# Patient Record
Sex: MALE | Race: WHITE | NOT HISPANIC OR LATINO | Employment: FULL TIME | URBAN - METROPOLITAN AREA
[De-identification: names, ages, dates, MRNs, and addresses within clinical notes are randomized per-mention and may not be internally consistent; named-entity substitution may affect disease eponyms.]

---

## 2020-07-02 ENCOUNTER — RECORDS - HEALTHEAST (OUTPATIENT)
Dept: LAB | Facility: CLINIC | Age: 30
End: 2020-07-02

## 2020-07-02 LAB
ALBUMIN SERPL-MCNC: 3.8 G/DL (ref 3.5–5)
ALP SERPL-CCNC: 82 U/L (ref 45–120)
ALT SERPL W P-5'-P-CCNC: 79 U/L (ref 0–45)
ANION GAP SERPL CALCULATED.3IONS-SCNC: 15 MMOL/L (ref 5–18)
AST SERPL W P-5'-P-CCNC: 79 U/L (ref 0–40)
BILIRUB SERPL-MCNC: 1 MG/DL (ref 0–1)
BUN SERPL-MCNC: 6 MG/DL (ref 8–22)
CALCIUM SERPL-MCNC: 9.9 MG/DL (ref 8.5–10.5)
CHLORIDE BLD-SCNC: 107 MMOL/L (ref 98–107)
CO2 SERPL-SCNC: 20 MMOL/L (ref 22–31)
CREAT SERPL-MCNC: 0.94 MG/DL (ref 0.7–1.3)
GFR SERPL CREATININE-BSD FRML MDRD: >60 ML/MIN/1.73M2
GLUCOSE BLD-MCNC: 91 MG/DL (ref 70–125)
POTASSIUM BLD-SCNC: 3.7 MMOL/L (ref 3.5–5)
PROT SERPL-MCNC: 7.2 G/DL (ref 6–8)
SODIUM SERPL-SCNC: 142 MMOL/L (ref 136–145)

## 2020-07-17 ENCOUNTER — RECORDS - HEALTHEAST (OUTPATIENT)
Dept: LAB | Facility: CLINIC | Age: 30
End: 2020-07-17

## 2020-07-17 LAB
ANION GAP SERPL CALCULATED.3IONS-SCNC: 17 MMOL/L (ref 5–18)
BUN SERPL-MCNC: 6 MG/DL (ref 8–22)
CALCIUM SERPL-MCNC: 11.7 MG/DL (ref 8.5–10.5)
CHLORIDE BLD-SCNC: 94 MMOL/L (ref 98–107)
CO2 SERPL-SCNC: 26 MMOL/L (ref 22–31)
CREAT SERPL-MCNC: 1.12 MG/DL (ref 0.7–1.3)
GFR SERPL CREATININE-BSD FRML MDRD: >60 ML/MIN/1.73M2
GLUCOSE BLD-MCNC: 104 MG/DL (ref 70–125)
POTASSIUM BLD-SCNC: 3.5 MMOL/L (ref 3.5–5)
SODIUM SERPL-SCNC: 137 MMOL/L (ref 136–145)

## 2020-08-05 ENCOUNTER — RECORDS - HEALTHEAST (OUTPATIENT)
Dept: LAB | Facility: CLINIC | Age: 30
End: 2020-08-05

## 2020-08-05 LAB
ANION GAP SERPL CALCULATED.3IONS-SCNC: 21 MMOL/L (ref 5–18)
BUN SERPL-MCNC: 5 MG/DL (ref 8–22)
CALCIUM SERPL-MCNC: 9.8 MG/DL (ref 8.5–10.5)
CALCIUM, IONIZED MEASURED: 1.06 MMOL/L (ref 1.11–1.3)
CHLORIDE BLD-SCNC: 103 MMOL/L (ref 98–107)
CO2 SERPL-SCNC: 19 MMOL/L (ref 22–31)
CREAT SERPL-MCNC: 0.97 MG/DL (ref 0.7–1.3)
GFR SERPL CREATININE-BSD FRML MDRD: >60 ML/MIN/1.73M2
GLUCOSE BLD-MCNC: 132 MG/DL (ref 70–125)
ION CA PH 7.4: 1.07 MMOL/L (ref 1.11–1.3)
PH: 7.42 (ref 7.35–7.45)
POTASSIUM BLD-SCNC: 3.7 MMOL/L (ref 3.5–5)
PTH-INTACT SERPL-MCNC: 71 PG/ML (ref 10–86)
SODIUM SERPL-SCNC: 143 MMOL/L (ref 136–145)

## 2020-08-07 LAB — ALDOST SERPL-MCNC: 32.1 NG/DL

## 2020-08-08 LAB
ANNOTATION COMMENT IMP: ABNORMAL
METANEPHS SERPL-SCNC: 0.26 NMOL/L (ref 0–0.49)
NORMETANEPHRINE SERPL-SCNC: 1.12 NMOL/L (ref 0–0.89)
RENIN PLAS-CCNC: 22.6 NG/ML/HR

## 2020-09-29 ENCOUNTER — AMBULATORY - HEALTHEAST (OUTPATIENT)
Dept: OTHER | Facility: CLINIC | Age: 30
End: 2020-09-29

## 2020-09-29 ENCOUNTER — DOCUMENTATION ONLY (OUTPATIENT)
Dept: OTHER | Facility: CLINIC | Age: 30
End: 2020-09-29

## 2021-02-19 ENCOUNTER — COMMUNICATION - HEALTHEAST (OUTPATIENT)
Dept: SCHEDULING | Facility: CLINIC | Age: 31
End: 2021-02-19

## 2021-06-16 PROBLEM — K70.10 ALCOHOLIC HEPATITIS WITHOUT ASCITES (H): Status: ACTIVE | Noted: 2021-02-19

## 2021-06-16 PROBLEM — F10.939 ALCOHOL WITHDRAWAL SYNDROME WITH COMPLICATION (H): Status: ACTIVE | Noted: 2018-12-04

## 2021-06-16 PROBLEM — E87.8 ELECTROLYTE DISTURBANCE: Status: ACTIVE | Noted: 2021-02-19

## 2021-06-16 PROBLEM — K85.90 PANCREATITIS, UNSPECIFIED PANCREATITIS TYPE: Status: ACTIVE | Noted: 2021-02-18

## 2021-06-16 PROBLEM — D69.6 THROMBOCYTOPENIA (H): Status: ACTIVE | Noted: 2021-02-19

## 2022-03-03 ENCOUNTER — HOSPITAL ENCOUNTER (EMERGENCY)
Facility: HOSPITAL | Age: 32
Discharge: OTHER INSTITUTION | End: 2022-03-03
Attending: EMERGENCY MEDICINE | Admitting: EMERGENCY MEDICINE
Payer: COMMERCIAL

## 2022-03-03 VITALS
WEIGHT: 250 LBS | OXYGEN SATURATION: 90 % | RESPIRATION RATE: 16 BRPM | DIASTOLIC BLOOD PRESSURE: 66 MMHG | TEMPERATURE: 98.7 F | HEIGHT: 71 IN | SYSTOLIC BLOOD PRESSURE: 114 MMHG | BODY MASS INDEX: 35 KG/M2 | HEART RATE: 95 BPM

## 2022-03-03 DIAGNOSIS — F10.220 ALCOHOL DEPENDENCE WITH UNCOMPLICATED INTOXICATION (H): ICD-10-CM

## 2022-03-03 LAB
ALBUMIN SERPL-MCNC: 4.3 G/DL (ref 3.5–5)
ALP SERPL-CCNC: 100 U/L (ref 45–120)
ALT SERPL W P-5'-P-CCNC: 149 U/L (ref 0–45)
ANION GAP SERPL CALCULATED.3IONS-SCNC: 17 MMOL/L (ref 5–18)
AST SERPL W P-5'-P-CCNC: 158 U/L (ref 0–40)
BASOPHILS # BLD AUTO: 0.1 10E3/UL (ref 0–0.2)
BASOPHILS NFR BLD AUTO: 1 %
BILIRUB DIRECT SERPL-MCNC: 0.5 MG/DL
BILIRUB SERPL-MCNC: 1.3 MG/DL (ref 0–1)
BUN SERPL-MCNC: 5 MG/DL (ref 8–22)
CALCIUM SERPL-MCNC: 8.8 MG/DL (ref 8.5–10.5)
CHLORIDE BLD-SCNC: 101 MMOL/L (ref 98–107)
CO2 SERPL-SCNC: 22 MMOL/L (ref 22–31)
CREAT SERPL-MCNC: 0.8 MG/DL (ref 0.7–1.3)
EOSINOPHIL # BLD AUTO: 0.1 10E3/UL (ref 0–0.7)
EOSINOPHIL NFR BLD AUTO: 1 %
ERYTHROCYTE [DISTWIDTH] IN BLOOD BY AUTOMATED COUNT: 12.6 % (ref 10–15)
ETHANOL SERPL-MCNC: 447 MG/DL
GFR SERPL CREATININE-BSD FRML MDRD: >90 ML/MIN/1.73M2
GLUCOSE BLD-MCNC: 121 MG/DL (ref 70–125)
HCT VFR BLD AUTO: 49.6 % (ref 40–53)
HGB BLD-MCNC: 18 G/DL (ref 13.3–17.7)
IMM GRANULOCYTES # BLD: 0 10E3/UL
IMM GRANULOCYTES NFR BLD: 0 %
LIPASE SERPL-CCNC: 50 U/L (ref 0–52)
LYMPHOCYTES # BLD AUTO: 1.5 10E3/UL (ref 0.8–5.3)
LYMPHOCYTES NFR BLD AUTO: 31 %
MAGNESIUM SERPL-MCNC: 1.9 MG/DL (ref 1.8–2.6)
MCH RBC QN AUTO: 34.5 PG (ref 26.5–33)
MCHC RBC AUTO-ENTMCNC: 36.3 G/DL (ref 31.5–36.5)
MCV RBC AUTO: 95 FL (ref 78–100)
MONOCYTES # BLD AUTO: 0.5 10E3/UL (ref 0–1.3)
MONOCYTES NFR BLD AUTO: 11 %
NEUTROPHILS # BLD AUTO: 2.6 10E3/UL (ref 1.6–8.3)
NEUTROPHILS NFR BLD AUTO: 56 %
NRBC # BLD AUTO: 0 10E3/UL
NRBC BLD AUTO-RTO: 0 /100
PLATELET # BLD AUTO: 287 10E3/UL (ref 150–450)
POTASSIUM BLD-SCNC: 3.7 MMOL/L (ref 3.5–5)
PROT SERPL-MCNC: 7.1 G/DL (ref 6–8)
RBC # BLD AUTO: 5.21 10E6/UL (ref 4.4–5.9)
SARS-COV-2 RNA RESP QL NAA+PROBE: NEGATIVE
SODIUM SERPL-SCNC: 140 MMOL/L (ref 136–145)
WBC # BLD AUTO: 4.7 10E3/UL (ref 4–11)

## 2022-03-03 PROCEDURE — 83690 ASSAY OF LIPASE: CPT | Performed by: EMERGENCY MEDICINE

## 2022-03-03 PROCEDURE — 258N000003 HC RX IP 258 OP 636: Performed by: EMERGENCY MEDICINE

## 2022-03-03 PROCEDURE — 87635 SARS-COV-2 COVID-19 AMP PRB: CPT | Performed by: EMERGENCY MEDICINE

## 2022-03-03 PROCEDURE — 36415 COLL VENOUS BLD VENIPUNCTURE: CPT | Performed by: EMERGENCY MEDICINE

## 2022-03-03 PROCEDURE — A0110 NONEMERGENCY TRANSPORT BUS: HCPCS

## 2022-03-03 PROCEDURE — 96374 THER/PROPH/DIAG INJ IV PUSH: CPT

## 2022-03-03 PROCEDURE — 82435 ASSAY OF BLOOD CHLORIDE: CPT | Performed by: EMERGENCY MEDICINE

## 2022-03-03 PROCEDURE — 85025 COMPLETE CBC W/AUTO DIFF WBC: CPT | Performed by: EMERGENCY MEDICINE

## 2022-03-03 PROCEDURE — 250N000013 HC RX MED GY IP 250 OP 250 PS 637: Performed by: EMERGENCY MEDICINE

## 2022-03-03 PROCEDURE — 250N000011 HC RX IP 250 OP 636: Performed by: EMERGENCY MEDICINE

## 2022-03-03 PROCEDURE — 96375 TX/PRO/DX INJ NEW DRUG ADDON: CPT

## 2022-03-03 PROCEDURE — 250N000009 HC RX 250: Performed by: EMERGENCY MEDICINE

## 2022-03-03 PROCEDURE — 82077 ASSAY SPEC XCP UR&BREATH IA: CPT | Performed by: EMERGENCY MEDICINE

## 2022-03-03 PROCEDURE — 82248 BILIRUBIN DIRECT: CPT | Performed by: EMERGENCY MEDICINE

## 2022-03-03 PROCEDURE — C9803 HOPD COVID-19 SPEC COLLECT: HCPCS

## 2022-03-03 PROCEDURE — 99285 EMERGENCY DEPT VISIT HI MDM: CPT | Mod: 25

## 2022-03-03 PROCEDURE — 83735 ASSAY OF MAGNESIUM: CPT | Performed by: EMERGENCY MEDICINE

## 2022-03-03 PROCEDURE — 96361 HYDRATE IV INFUSION ADD-ON: CPT

## 2022-03-03 PROCEDURE — 82947 ASSAY GLUCOSE BLOOD QUANT: CPT | Performed by: EMERGENCY MEDICINE

## 2022-03-03 RX ORDER — ONDANSETRON 2 MG/ML
4 INJECTION INTRAMUSCULAR; INTRAVENOUS ONCE
Status: COMPLETED | OUTPATIENT
Start: 2022-03-03 | End: 2022-03-03

## 2022-03-03 RX ORDER — NICOTINE 21 MG/24HR
1 PATCH, TRANSDERMAL 24 HOURS TRANSDERMAL ONCE
Status: DISCONTINUED | OUTPATIENT
Start: 2022-03-03 | End: 2022-03-03 | Stop reason: HOSPADM

## 2022-03-03 RX ADMIN — NICOTINE 1 PATCH: 21 PATCH, EXTENDED RELEASE TRANSDERMAL at 13:38

## 2022-03-03 RX ADMIN — SODIUM CHLORIDE 1000 ML: 9 INJECTION, SOLUTION INTRAVENOUS at 12:52

## 2022-03-03 RX ADMIN — ONDANSETRON 4 MG: 2 INJECTION INTRAMUSCULAR; INTRAVENOUS at 12:51

## 2022-03-03 RX ADMIN — FAMOTIDINE 20 MG: 10 INJECTION, SOLUTION INTRAVENOUS at 12:52

## 2022-03-03 ASSESSMENT — ENCOUNTER SYMPTOMS: ABDOMINAL PAIN: 1

## 2022-03-03 NOTE — ED NOTES
Report called to [facility]. Ambulated pt in room to assure safe to go. Pt did well. leah being called to transport.

## 2022-03-03 NOTE — DISCHARGE INSTRUCTIONS
Substance Use Treatment (Rule 25) Information -     To access chemical dependency treatment you must have an assessment complete or have an updatedassessment within 30 days of starting any program.    Information for this to be completed and to secure funding if you have medical assistance or no insurance can be found through your [facility]'s chemical health intakeline.     If you have private insurance contact the customer service number on the back of your insurance card to determine the required steps for accessingservices through your insurance provider.     [facility] RULE 25 INFORMATION -   [place] - [phone]  [place] - [phone]  [facility] [phone]  [phone]  [facility] [phone]  [facility] [phone]  [place] - [phone]  [facility] [phone]    Once approved for funding you canconnect with a facility that does Rule 25 assessment.     The following facilities offer Rule 25 assessments -     [facility]  [phone]  [facility] - Outpatient Mental Health and Addiction   [address] [facility] [address]    [facility] ([facility])  [phone]  [address] [place] [address]  *Walk in assessments also available M-F starting at 8 am.     [facility]   [phone]  [facility]  [address] [facility] [address]    [facility]   [phone]  [address] [place] [address]    [facility]  [phone] - [facility]   [phone]  [address]  [facility]    [facility]  [phone]  [address] [facility]    [facility]  [phone]  [address] [facility] [address]    If youare intoxicated  You may be required to detox at a detox facility before starting treatment.    [facility]- [address] [place] [address].  [place], [place].     [phone]    [facility]: [phone]  [facility]: [phone]  [facility]: [phone]  [place]     *All information subject to change by facility.

## 2022-03-03 NOTE — ED PROVIDER NOTES
Emergency Department Encounter     Evaluation Date & Time:   3/3/2022 12:16 PM    CHIEF COMPLAINT:  Alcohol Intoxication      Triage Note:Arrived via ems-pt wants treatment. 1.75 liters of vodka daily previous 3 months sobreity. bg 102, htn 161/100.             ED COURSE & MEDICAL DECISION MAKING:     ED Course as of 03/03/22 1406   Thu Mar 03, 2022   1331 Alcohol level 447.  Lipase and electrolytes, chemistry unremarkable.     Pt with a history of etoh abuse/dependence, hospitalized in the past and returning for uncontrolled etoh abuse over the past 3 weeks.  Pt reports being sober for previous 5 months.  He reports nausea and epigastric pain.  He tells me he has a history of withdrawal seizures in the remote past.  He's quite intoxicated appearing here. Will get labs, hydrate, treat symptomatically and reassess.  Pt is wanting treatment, has been trying to get into a program, but unsuccessful.      12:24 PM I met with the patient, obtained history, performed an initial exam, and discussed options and plan for diagnostics and treatment here in the ED. PPE worn including N95 mask, surgical gloves, face shield.  1:57 PM Pt is agreeable to going to Detox, so RN to call around.  Will anticipate discharge to Detox.  Pt is able to ambulate, care for himself, appropriate for detox.  2:06 PM Nursing informed me that [facility] has a bed and they will take report around 1500.  Pt will be signed out to Dr. Fuller pending acceptance to Detox.    At the conclusion of the encounter I discussed the results of all the tests and the disposition. The questions were answered. The patient or family acknowledged understanding and was agreeable with the care plan.      MEDICATIONS GIVEN IN THE EMERGENCY DEPARTMENT:  Medications   nicotine (NICODERM CQ) 21 MG/24HR 24 hr patch 1 patch (1 patch Transdermal Patch/Med Applied 3/3/22 6158)   nicotine Patch in Place ( Transdermal Patch in Place 3/3/22 9054)   0.9% sodium chloride  "BOLUS (0 mLs Intravenous Stopped 3/3/22 1348)   ondansetron (ZOFRAN) injection 4 mg (4 mg Intravenous Given 3/3/22 1251)   famotidine (PEPCID) injection 20 mg (20 mg Intravenous Given 3/3/22 1252)       NEW PRESCRIPTIONS STARTED AT TODAY'S ED VISIT:  New Prescriptions    No medications on file       HPI   HPI limited secondary to alcohol intoxication.    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 Patient reports he was sober for 5 months but started to drink 2 weeks ago. RN notes the patient began to drink particularly heavily (1.75L per day) for the past week. Today, he reports calling EMS as he drank too much and \"I couldn't deal with it\" and wants to attend detox. However, patient reports he has attempted to reach out to a variety of detox facilities, but sty did not have beds. Patient reports he is having difficulty walking secondary to alcohol intoxication. Patient endorses associated nausea, vomiting, LUQ abdominal pain. He states he has a history of abdominal pain with drinking in the past.    He endorses a history of alcohol withdrawal in the past with symptoms of headaches and seizures. He reports his last seizure was 2 years ago.      Denies fever, cough, dysuria or any other changes in urinary habits, suicidal ideation, homicidal ideation. No reported daily medication use.    SHx- Denies illicit drug use. Patient is currently living with his parents.    REVIEW OF SYSTEMS:  Review of Systems   Unable to perform ROS: Other (intoxication)   Constitutional:        Positive for alcohol intoxication.   Gastrointestinal: Positive for abdominal pain (LUQ).   Psychiatric/Behavioral: Negative for suicidal ideas.        Denies homicidal ideation.   All " "other systems reviewed and are negative.          Medical History   No past medical history on file.    No past surgical history on file.    No family history on file.    Social History     Tobacco Use     Smoking status: Current Every Day Smoker     Smokeless tobacco: Not on file   Substance Use Topics     Alcohol use: Yes     Comment: Alcoholic Drinks/day: 1 liter of vodka per day     Drug use: No       No current outpatient medications on file.      Physical Exam     Vitals:  BP (!) 142/83   Pulse 108   Temp 98.7  F (37.1  C) (Oral)   Resp 16   Ht 1.803 m (5' 11\")   Wt 113.4 kg (250 lb)   SpO2 97%   BMI 34.87 kg/m      PHYSICAL EXAM:   Physical Exam  Vitals and nursing note reviewed.   Constitutional:       General: He is not in acute distress.     Appearance: Normal appearance.      Comments: Intoxicated with slurred speech.    HENT:      Head: Normocephalic and atraumatic.      Nose: Nose normal.      Mouth/Throat:      Mouth: Mucous membranes are moist.   Eyes:      Pupils: Pupils are equal, round, and reactive to light.   Cardiovascular:      Rate and Rhythm: Regular rhythm. Tachycardia present.      Pulses: Normal pulses.           Radial pulses are 2+ on the right side and 2+ on the left side.        Dorsalis pedis pulses are 2+ on the right side and 2+ on the left side.   Pulmonary:      Effort: Pulmonary effort is normal. No respiratory distress.      Breath sounds: Normal breath sounds.   Abdominal:      Palpations: Abdomen is soft.      Comments: Mild epigastric tenderness, no distention, no rigidity.   Musculoskeletal:      Cervical back: Full passive range of motion without pain and neck supple.      Comments: No calf tenderness or swelling b/l   Skin:     General: Skin is warm.      Findings: No rash.   Neurological:      General: No focal deficit present.      Mental Status: Mental status is at baseline.      Comments: Fluent speech, no acute lateralizing deficits   Psychiatric:         Mood " and Affect: Mood normal.         Behavior: Behavior normal.         Thought Content: Thought content does not include homicidal or suicidal ideation.         Results     LAB:  All pertinent labs reviewed and interpreted  Labs Ordered and Resulted from Time of ED Arrival to Time of ED Departure   BASIC METABOLIC PANEL - Abnormal       Result Value    Sodium 140      Potassium 3.7      Chloride 101      Carbon Dioxide (CO2) 22      Anion Gap 17      Urea Nitrogen 5 (*)     Creatinine 0.80      Calcium 8.8      Glucose 121      GFR Estimate >90     HEPATIC FUNCTION PANEL - Abnormal    Bilirubin Total 1.3 (*)     Bilirubin Direct 0.5      Protein Total 7.1      Albumin 4.3      Alkaline Phosphatase 100       (*)      (*)    ETHYL ALCOHOL LEVEL - Abnormal    Alcohol, Blood 447 (*)    CBC WITH PLATELETS AND DIFFERENTIAL - Abnormal    WBC Count 4.7      RBC Count 5.21      Hemoglobin 18.0 (*)     Hematocrit 49.6      MCV 95      MCH 34.5 (*)     MCHC 36.3      RDW 12.6      Platelet Count 287      % Neutrophils 56      % Lymphocytes 31      % Monocytes 11      % Eosinophils 1      % Basophils 1      % Immature Granulocytes 0      NRBCs per 100 WBC 0      Absolute Neutrophils 2.6      Absolute Lymphocytes 1.5      Absolute Monocytes 0.5      Absolute Eosinophils 0.1      Absolute Basophils 0.1      Absolute Immature Granulocytes 0.0      Absolute NRBCs 0.0     LIPASE - Normal    Lipase 50     MAGNESIUM - Normal    Magnesium 1.9     COVID-19 VIRUS (CORONAVIRUS) BY PCR       RADIOLOGY:  No orders to display                ECG:  none    PROCEDURES:  Procedures:  none      FINAL IMPRESSION:    ICD-10-CM    1. Alcohol dependence with uncomplicated intoxication (H)  F10.220        0 minutes of critical care time      I, Deandre Sosa, am serving as a scribe to document services personally performed by Dr. Torey Orellana, based on my observations and the provider's statements to me. ITorey, DO  attest that Deandre Sosa is acting in a scribe capacity, has observed my performance of the services and has documented them in accordance with my direction.      Torey Orellana,   Emergency Medicine  [facility] EMERGENCY DEPARTMENT  3/3/2022  12:23 PM          Torey Orellana MD  03/03/22 1400

## 2022-03-03 NOTE — ED PROVIDER NOTES
ED Provider Signout Note    Patient signed out to me at shift change pending confirmation of detox bed availability.  Ultimately a detox bed was secured for the patient at [facility].  He will be transported there via van.  I provided him with resources for rule 25 assessment.  No other events and patient left in stable condition.     Moo Fuller MD  03/03/22 2496

## 2022-03-03 NOTE — ED TRIAGE NOTES
Arrived via ems-pt wants treatment. 1.75 liters of vodka daily previous 3 months sobreity. bg 102, htn 161/100.

## 2022-03-03 NOTE — PROGRESS NOTES
"Patient left room, went outside- \"need to smoke.\"  Returned to room, discharged to [place].  "

## 2022-03-03 NOTE — ED NOTES
Bed: JNED-03  Expected date: 3/3/22  Expected time:   Means of arrival: Ambulance  Comments:  Keith SepulvedaM  ETOH